# Patient Record
Sex: FEMALE
[De-identification: names, ages, dates, MRNs, and addresses within clinical notes are randomized per-mention and may not be internally consistent; named-entity substitution may affect disease eponyms.]

---

## 2017-06-26 ENCOUNTER — APPOINTMENT (OUTPATIENT)
Dept: THORACIC SURGERY | Facility: CLINIC | Age: 67
End: 2017-06-26

## 2017-06-26 VITALS
WEIGHT: 168 LBS | BODY MASS INDEX: 30.91 KG/M2 | HEART RATE: 85 BPM | HEIGHT: 62 IN | OXYGEN SATURATION: 98 % | SYSTOLIC BLOOD PRESSURE: 150 MMHG | RESPIRATION RATE: 16 BRPM | DIASTOLIC BLOOD PRESSURE: 91 MMHG

## 2017-06-26 DIAGNOSIS — R59.0 LOCALIZED ENLARGED LYMPH NODES: ICD-10-CM

## 2017-06-26 DIAGNOSIS — Z86.19 PERSONAL HISTORY OF OTHER INFECTIOUS AND PARASITIC DISEASES: ICD-10-CM

## 2017-06-26 DIAGNOSIS — Z86.69 PERSONAL HISTORY OF OTHER DISEASES OF THE NERVOUS SYSTEM AND SENSE ORGANS: ICD-10-CM

## 2017-06-26 DIAGNOSIS — R91.8 OTHER NONSPECIFIC ABNORMAL FINDING OF LUNG FIELD: ICD-10-CM

## 2017-06-26 DIAGNOSIS — Z86.2 PERSONAL HISTORY OF DISEASES OF THE BLOOD AND BLOOD-FORMING ORGANS AND CERTAIN DISORDERS INVOLVING THE IMMUNE MECHANISM: ICD-10-CM

## 2017-06-26 DIAGNOSIS — Z87.39 PERSONAL HISTORY OF OTHER DISEASES OF THE MUSCULOSKELETAL SYSTEM AND CONNECTIVE TISSUE: ICD-10-CM

## 2017-06-26 DIAGNOSIS — Z87.891 PERSONAL HISTORY OF NICOTINE DEPENDENCE: ICD-10-CM

## 2017-06-26 DIAGNOSIS — Z80.9 FAMILY HISTORY OF MALIGNANT NEOPLASM, UNSPECIFIED: ICD-10-CM

## 2017-06-26 DIAGNOSIS — K57.80 DIVERTICULITIS OF INTESTINE, PART UNSPECIFIED, WITH PERFORATION AND ABSCESS W/OUT BLEEDING: ICD-10-CM

## 2017-06-26 RX ORDER — ASPIRIN 81 MG
81 TABLET, DELAYED RELEASE (ENTERIC COATED) ORAL
Refills: 0 | Status: ACTIVE | COMMUNITY

## 2017-06-26 RX ORDER — MULTIVIT-MIN/IRON/FOLIC ACID/K 18-600-40
CAPSULE ORAL
Refills: 0 | Status: ACTIVE | COMMUNITY

## 2017-06-26 RX ORDER — PANTOPRAZOLE SODIUM 40 MG/1
40 TABLET, DELAYED RELEASE ORAL
Refills: 0 | Status: ACTIVE | COMMUNITY

## 2017-06-26 RX ORDER — BACILLUS COAGULANS/INULIN 1B-250 MG
CAPSULE ORAL
Refills: 0 | Status: ACTIVE | COMMUNITY

## 2017-06-26 RX ORDER — METOPROLOL TARTRATE 75 MG/1
TABLET, FILM COATED ORAL
Refills: 0 | Status: ACTIVE | COMMUNITY

## 2017-06-26 RX ORDER — CALCIUM CARBONATE/VITAMIN D3 500-10/5ML
400 LIQUID (ML) ORAL
Refills: 0 | Status: ACTIVE | COMMUNITY

## 2017-06-27 ENCOUNTER — OUTPATIENT (OUTPATIENT)
Dept: OUTPATIENT SERVICES | Facility: HOSPITAL | Age: 67
LOS: 1 days | End: 2017-06-27
Payer: MEDICARE

## 2017-06-27 VITALS
RESPIRATION RATE: 16 BRPM | HEART RATE: 78 BPM | TEMPERATURE: 97 F | DIASTOLIC BLOOD PRESSURE: 80 MMHG | HEIGHT: 61 IN | SYSTOLIC BLOOD PRESSURE: 110 MMHG | WEIGHT: 173.06 LBS

## 2017-06-27 DIAGNOSIS — R91.8 OTHER NONSPECIFIC ABNORMAL FINDING OF LUNG FIELD: ICD-10-CM

## 2017-06-27 DIAGNOSIS — C34.90 MALIGNANT NEOPLASM OF UNSPECIFIED PART OF UNSPECIFIED BRONCHUS OR LUNG: ICD-10-CM

## 2017-06-27 DIAGNOSIS — K57.92 DIVERTICULITIS OF INTESTINE, PART UNSPECIFIED, WITHOUT PERFORATION OR ABSCESS WITHOUT BLEEDING: Chronic | ICD-10-CM

## 2017-06-27 DIAGNOSIS — C34.90 MALIGNANT NEOPLASM OF UNSPECIFIED PART OF UNSPECIFIED BRONCHUS OR LUNG: Chronic | ICD-10-CM

## 2017-06-27 DIAGNOSIS — M51.9 UNSPECIFIED THORACIC, THORACOLUMBAR AND LUMBOSACRAL INTERVERTEBRAL DISC DISORDER: Chronic | ICD-10-CM

## 2017-06-27 DIAGNOSIS — I10 ESSENTIAL (PRIMARY) HYPERTENSION: ICD-10-CM

## 2017-06-27 LAB
ANTIBODY ID 1_1: SIGNIFICANT CHANGE UP
BLD GP AB SCN SERPL QL: POSITIVE — SIGNIFICANT CHANGE UP
BUN SERPL-MCNC: 19 MG/DL — SIGNIFICANT CHANGE UP (ref 7–23)
CALCIUM SERPL-MCNC: 9.5 MG/DL — SIGNIFICANT CHANGE UP (ref 8.4–10.5)
CHLORIDE SERPL-SCNC: 106 MMOL/L — SIGNIFICANT CHANGE UP (ref 98–107)
CO2 SERPL-SCNC: 23 MMOL/L — SIGNIFICANT CHANGE UP (ref 22–31)
CREAT SERPL-MCNC: 0.82 MG/DL — SIGNIFICANT CHANGE UP (ref 0.5–1.3)
DAT C3-SP REAG RBC QL: NEGATIVE — SIGNIFICANT CHANGE UP
DAT POLY-SP REAG RBC QL: POSITIVE — SIGNIFICANT CHANGE UP
DIRECT COOMBS IGG: POSITIVE — SIGNIFICANT CHANGE UP
ELUATE ANTIBODY 1: SIGNIFICANT CHANGE UP
GLUCOSE SERPL-MCNC: 102 MG/DL — HIGH (ref 70–99)
HCT VFR BLD CALC: 34.4 % — LOW (ref 34.5–45)
HGB BLD-MCNC: 10.7 G/DL — LOW (ref 11.5–15.5)
MCHC RBC-ENTMCNC: 27.7 PG — SIGNIFICANT CHANGE UP (ref 27–34)
MCHC RBC-ENTMCNC: 31.1 % — LOW (ref 32–36)
MCV RBC AUTO: 89.1 FL — SIGNIFICANT CHANGE UP (ref 80–100)
PLATELET # BLD AUTO: 221 K/UL — SIGNIFICANT CHANGE UP (ref 150–400)
PMV BLD: 10.5 FL — SIGNIFICANT CHANGE UP (ref 7–13)
POTASSIUM SERPL-MCNC: 4.2 MMOL/L — SIGNIFICANT CHANGE UP (ref 3.5–5.3)
POTASSIUM SERPL-SCNC: 4.2 MMOL/L — SIGNIFICANT CHANGE UP (ref 3.5–5.3)
RBC # BLD: 3.86 M/UL — SIGNIFICANT CHANGE UP (ref 3.8–5.2)
RBC # FLD: 17.8 % — HIGH (ref 10.3–14.5)
RH IG SCN BLD-IMP: POSITIVE — SIGNIFICANT CHANGE UP
SODIUM SERPL-SCNC: 143 MMOL/L — SIGNIFICANT CHANGE UP (ref 135–145)
WBC # BLD: 6.61 K/UL — SIGNIFICANT CHANGE UP (ref 3.8–10.5)
WBC # FLD AUTO: 6.61 K/UL — SIGNIFICANT CHANGE UP (ref 3.8–10.5)

## 2017-06-27 PROCEDURE — 93010 ELECTROCARDIOGRAM REPORT: CPT

## 2017-06-27 PROCEDURE — 86077 PHYS BLOOD BANK SERV XMATCH: CPT

## 2017-06-27 RX ORDER — ATORVASTATIN CALCIUM 80 MG/1
1 TABLET, FILM COATED ORAL
Qty: 0 | Refills: 0 | COMMUNITY

## 2017-06-27 RX ORDER — PANTOPRAZOLE SODIUM 20 MG/1
1 TABLET, DELAYED RELEASE ORAL
Qty: 0 | Refills: 0 | COMMUNITY

## 2017-06-27 RX ORDER — METOPROLOL TARTRATE 50 MG
0 TABLET ORAL
Qty: 0 | Refills: 0 | COMMUNITY

## 2017-06-27 RX ORDER — L.ACIDOPH/B.ANIMALIS/B.LONGUM 15B CELL
1 CAPSULE ORAL
Qty: 0 | Refills: 0 | COMMUNITY

## 2017-06-27 RX ORDER — MAGNESIUM OXIDE 400 MG ORAL TABLET 241.3 MG
0 TABLET ORAL
Qty: 0 | Refills: 0 | COMMUNITY

## 2017-06-27 NOTE — H&P PST ADULT - NSANTHOSAYNRD_GEN_A_CORE
No. AMAIRANI screening performed.  STOP BANG Legend: 0-2 = LOW Risk; 3-4 = INTERMEDIATE Risk; 5-8 = HIGH Risk

## 2017-06-27 NOTE — H&P PST ADULT - PSH
Diverticulitis  Jan. 2017, temporary colostomy due to perforated diverticulits with sepsis -- eventual reversal in May 2017  History of total knee replacement  left- May 2008  Lumbar disc disease  lumbar fusion in 2013 Diverticulitis  Jan. 2017, temporary colostomy due to perforated diverticulits with sepsis -- eventual reversal in May 2017  History of total knee replacement  left- May 2008  Lumbar disc disease  lumbar fusion in 2013  Lung cancer  biopsy -- "squamous cell" right lung diagnosed in May 2017

## 2017-06-27 NOTE — H&P PST ADULT - HISTORY OF PRESENT ILLNESS
This is a  65 y/o female who presents with h/o perforated diverticulitis with sepsis in Jan 2017. Hospitalized for one month with colon resection, temporary colostomy. During that hospitalization, patient had pulmonary edema with "kidneys shutting down." Eventually underwent Reversal of colostomy in May 2017. At that time, an incidental cxr done which revealed "right lung mass." Subsequent PET scan revealed "lymph node enlargement." Also underwent brain scan. Intervention recommended. Scheduled for endobronchial ultrasound, biopsy, possible mediastinoscopy on 6-30-17. This is a  65 y/o female who presents with h/o perforated diverticulitis with sepsis in Jan 2017. Hospitalized for one month with colon resection, temporary colostomy. During that hospitalization, patient had pulmonary edema with "kidneys shutting down." Eventually underwent Reversal of colostomy in May 2017. At that time, an incidental cxr done which revealed "right lung mass." Subsequent PET scan revealed "lymph node enlargement." Biopsy of right lung mass revealed malignancy ("squamous cell"). Also underwent brain scan. Intervention recommended. Scheduled for endobronchial ultrasound, biopsy, possible mediastinoscopy on 6-30-17.

## 2017-06-27 NOTE — H&P PST ADULT - PROBLEM SELECTOR PLAN 1
This is a 67 y/o female who is scheduled for endobronchial US biopsy, possible mediastinoscopy on 6-30-17  * Instructed to take normal am dose of pantoprazole, metoprolol (if BP meets criteria)  * Await medical clearance with pcp This is a 67 y/o female who is scheduled for endobronchial US biopsy, possible mediastinoscopy on 6-30-17  * Instructed to take normal am dose of pantoprazole, metoprolol (if BP meets criteria)  * Await medical clearance with pcp done prior to PAST appointment This is a 67 y/o female who is scheduled for endobronchial US biopsy, possible mediastinoscopy on 6-30-17  * Instructed to take normal am dose of pantoprazole, metoprolol (if BP meets criteria) the am of surgery  * Await medical clearance with pcp done prior to PAST appointment

## 2017-06-27 NOTE — H&P PST ADULT - PMH
Anemia    Enlarged lymph node  diagnosed in May 2017  Former smoker    History of osteoarthritis    Hyperlipidemia    Hypertension    Lumbar disc disease  L5-S1 herniated disc with left leg weakness, left foot drop foot -- ambulates with walker  Lung mass  right lung diagnosed in May 2017  Obesity    Pulmonary edema  Jan. 2017, when hospitalized for perforated diverticulitis with sepsis  Renal insufficiency  Jan. 2017, during hospitalization for perforated diverticulits with sepsis and eventual temporary colostomy Anemia    Enlarged lymph node  diagnosed in May 2017  Former smoker    GERD (gastroesophageal reflux disease)    History of osteoarthritis    Hyperlipidemia    Hypertension    Lumbar disc disease  L5-S1 herniated disc with left leg weakness, left foot drop foot -- ambulates with walker  Lung cancer  "squamous cell" diagnosed in May 2017  Obesity    Pulmonary edema  Jan. 2017, when hospitalized for perforated diverticulitis with sepsis  Renal insufficiency  Jan. 2017, during hospitalization for perforated diverticulits with sepsis and eventual temporary colostomy Anemia    Enlarged lymph node  diagnosed in May 2017  Foot drop, left foot  due to lumbar disc disease  Former smoker    GERD (gastroesophageal reflux disease)    History of osteoarthritis    Hyperlipidemia    Hypertension    Lumbar disc disease  L5-S1 herniated disc with left leg weakness, left foot drop foot -- ambulates with walker  Lung cancer  "squamous cell" diagnosed in May 2017  Obesity    Pulmonary edema  Jan. 2017, when hospitalized for perforated diverticulitis with sepsis  Renal insufficiency  Jan. 2017, during hospitalization for perforated diverticulits with sepsis and eventual temporary colostomy Anemia    Enlarged lymph node  diagnosed in May 2017  Foot drop, left foot  due to lumbar disc disease  Former smoker    GERD (gastroesophageal reflux disease)    History of osteoarthritis    Hyperlipidemia    Hypertension    Lumbar disc disease  had surgery in 2013 with hardware  Lumbar disc disease  presently, June 2017, L5-S1 herniated disc with left leg weakness, left foot drop foot -- ambulates with walker  Lung cancer  "squamous cell" diagnosed in May 2017  Obesity    Pulmonary edema  Jan. 2017, when hospitalized for perforated diverticulitis with sepsis  Renal insufficiency  Jan. 2017, during hospitalization for perforated diverticulits with sepsis and eventual temporary colostomy

## 2017-06-27 NOTE — H&P PST ADULT - FAMILY HISTORY
Mother  Still living? No  Family history of coronary artery disease, Age at diagnosis: Age Unknown  Family history of lung cancer, Age at diagnosis: Age Unknown     Father  Still living? No  Family history of stomach cancer, Age at diagnosis: Age Unknown

## 2017-06-27 NOTE — H&P PST ADULT - ACTIVITY
c/o SOB with walker after ambulating 75 feet can ambulate with walker without any symptomatology of SOB

## 2017-06-27 NOTE — H&P PST ADULT - MUSCULOSKELETAL COMMENTS
left knee replacement c/o lower back pain with left drop foot and left leg weakness -- ambulates with walker

## 2020-12-29 NOTE — H&P PST ADULT - EKG AND INTERPRETATION
Patient called the nurse voicemail and requested a call back where she missed a call. I attempted to call patient twice and number was busy both times. done at PAST

## 2024-06-06 NOTE — H&P PST ADULT - CONSTITUTIONAL COMMENTS
Refill request for controlled substance.      Date of request: 6/6/2024    Medication requested: Tramadol  Last OV: 3/27/24  Last UDS: 12/9/22  Contract signed: yes    Date:10/18/23  Next office visit: 7/26/24    Yulia Talley    
lost 30 pounds while hospitalized for one month with sepsis in Jan. 2017